# Patient Record
(demographics unavailable — no encounter records)

---

## 2025-04-21 NOTE — HISTORY OF PRESENT ILLNESS
[de-identified] : 52 years old male with HTN, DM, HLD, OBESITY presents for annual physical exam, he gained significant weight over 30 pounds , states feeling stressed out due to personal problems

## 2025-04-21 NOTE — PLAN
[FreeTextEntry1] : 1. annual physical exam * routine general labs done * age appropriate health maintenance recommendations reviewed, discussed including healthy diet, regular exercise 2. HTN * c/w losartan 50 mg, refilled 3. type 2 DM * Dietary counseling given, dietary avoidance discussed, diet and exercise reviewed with patient; patient reminded of importance of aerobic exercise, weight control, dietary compliance and regular glucose monitoring * c/w metformin 1,000 mg, refilled 4. morbid obesity * Dietary counseling given, dietary avoidance discussed, diet and exercise reviewed with patient; patient reminded of importance of aerobic exercise, weight control, dietary compliance and regular glucose monitoring * he has indication for GLP-1 medication for CV mortality prevention, glycemic control and to help him loose weight 5. colon cancer screening * cologuard * f/u in three months

## 2025-04-21 NOTE — HEALTH RISK ASSESSMENT
[Fair] :  ~his/her~ mood as fair [No] : No [No falls in past year] : Patient reported no falls in the past year [0] : 2) Feeling down, depressed, or hopeless: Not at all (0) [Yes] : Reviewed medication list for presence of high-risk medications. [Current] : Current [0-4] : 0-4 [NO] : No [HIV test declined] : HIV test declined [Hepatitis C test declined] : Hepatitis C test declined [Alone] : lives alone [Employed] : employed [High School] : high school [] :  [# Of Children ___] : has [unfilled] children [Sexually Active] : sexually active [Feels Safe at Home] : Feels safe at home [Fully functional (bathing, dressing, toileting, transferring, walking, feeding)] : Fully functional (bathing, dressing, toileting, transferring, walking, feeding) [Fully functional (using the telephone, shopping, preparing meals, housekeeping, doing laundry, using] : Fully functional and needs no help or supervision to perform IADLs (using the telephone, shopping, preparing meals, housekeeping, doing laundry, using transportation, managing medications and managing finances) [Smoke Detector] : smoke detector [Carbon Monoxide Detector] : carbon monoxide detector [Seat Belt] :  uses seat belt [[id number]] : 0 [Change in mental status noted] : No change in mental status noted [Reports changes in hearing] : Reports no changes in hearing [Reports changes in vision] : Reports no changes in vision [Reports changes in dental health] : Reports no changes in dental health [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

## 2025-07-21 NOTE — HISTORY OF PRESENT ILLNESS
[FreeTextEntry1] : follow up Interview and discussion conducted in [place] by [place] speaking physician  [de-identified] : 52 years old male with HTN, obesity, T2DM presents for follow up to review labs results; complaints of bilateral tinnitus and ear wax, started after he cleaned ears with Q-tips; denies pain, he has a high score on screening NATALIE test; admits snoring loudly and wife witnessed apnei episodes

## 2025-07-21 NOTE — PLAN
[FreeTextEntry1] : 1. type 2 DM not at goal * importance of dietary and medication compliance  stressed * c/w Metformin * Rx for Zepbound 2.5 mg SC weekly, indicated for uncontrolled DM, for glycemia control and CV protection due to comorbid conditions * Dietary counseling given, dietary avoidance discussed, diet and exercise reviewed with patient; patient reminded of importance of aerobic exercise, weight control, dietary compliance and regular glucose monitoring 2. morbid obesity  The patient has a moderate to high pre-test probability of Obstructive Sleep Apnea. This assessment is based on a high STOP-BANG score, medical history and clinical examination. As a result, diagnostic polysomnography is recommended to determine the presence and severity of this disorder * patient with significant CV risk, and comorbid conditions, need for GLP-1 medication indicated * patient counselled on healthy diet with limited calories, regular exercises, low fat choices, limit or eliminate junk food, and to have family meals as often as possible. 3. HTN * medication adherence compliance stressed * c/w losartan 50 mg 4. vitamin D deficiency * recommended daily supplement 5. tinnitus both ears * ENT referral * refill for HC- acetic acid otic solution * f/u in three months